# Patient Record
Sex: FEMALE | Race: WHITE | NOT HISPANIC OR LATINO
[De-identification: names, ages, dates, MRNs, and addresses within clinical notes are randomized per-mention and may not be internally consistent; named-entity substitution may affect disease eponyms.]

---

## 2023-01-03 PROBLEM — Z00.00 ENCOUNTER FOR PREVENTIVE HEALTH EXAMINATION: Status: ACTIVE | Noted: 2023-01-03

## 2023-02-22 ENCOUNTER — RESULT REVIEW (OUTPATIENT)
Age: 48
End: 2023-02-22

## 2023-02-22 ENCOUNTER — APPOINTMENT (OUTPATIENT)
Dept: PAIN MANAGEMENT | Facility: CLINIC | Age: 48
End: 2023-02-22
Payer: COMMERCIAL

## 2023-02-22 ENCOUNTER — TRANSCRIPTION ENCOUNTER (OUTPATIENT)
Age: 48
End: 2023-02-22

## 2023-02-22 VITALS
BODY MASS INDEX: 21.9 KG/M2 | HEART RATE: 77 BPM | SYSTOLIC BLOOD PRESSURE: 93 MMHG | DIASTOLIC BLOOD PRESSURE: 61 MMHG | HEIGHT: 70 IN | WEIGHT: 153 LBS | OXYGEN SATURATION: 100 %

## 2023-02-22 DIAGNOSIS — F41.9 ANXIETY DISORDER, UNSPECIFIED: ICD-10-CM

## 2023-02-22 DIAGNOSIS — M54.16 RADICULOPATHY, LUMBAR REGION: ICD-10-CM

## 2023-02-22 DIAGNOSIS — E78.00 PURE HYPERCHOLESTEROLEMIA, UNSPECIFIED: ICD-10-CM

## 2023-02-22 DIAGNOSIS — M54.50 LOW BACK PAIN, UNSPECIFIED: ICD-10-CM

## 2023-02-22 DIAGNOSIS — F32.A DEPRESSION, UNSPECIFIED: ICD-10-CM

## 2023-02-22 DIAGNOSIS — N83.209 UNSPECIFIED OVARIAN CYST, UNSPECIFIED SIDE: ICD-10-CM

## 2023-02-22 DIAGNOSIS — Z83.438 FAMILY HISTORY OF OTHER DISORDER OF LIPOPROTEIN METABOLISM AND OTHER LIPIDEMIA: ICD-10-CM

## 2023-02-22 DIAGNOSIS — Z83.49 FAMILY HISTORY OF OTHER ENDOCRINE, NUTRITIONAL AND METABOLIC DISEASES: ICD-10-CM

## 2023-02-22 DIAGNOSIS — Z78.9 OTHER SPECIFIED HEALTH STATUS: ICD-10-CM

## 2023-02-22 DIAGNOSIS — Z82.49 FAMILY HISTORY OF ISCHEMIC HEART DISEASE AND OTHER DISEASES OF THE CIRCULATORY SYSTEM: ICD-10-CM

## 2023-02-22 DIAGNOSIS — Z82.5 FAMILY HISTORY OF ASTHMA AND OTHER CHRONIC LOWER RESPIRATORY DISEASES: ICD-10-CM

## 2023-02-22 DIAGNOSIS — M54.6 LOW BACK PAIN, UNSPECIFIED: ICD-10-CM

## 2023-02-22 PROCEDURE — 99243 OFF/OP CNSLTJ NEW/EST LOW 30: CPT

## 2023-02-22 RX ORDER — GABAPENTIN 100 MG/1
100 CAPSULE ORAL
Qty: 90 | Refills: 2 | Status: ACTIVE | COMMUNITY
Start: 2023-02-22 | End: 1900-01-01

## 2023-02-22 RX ORDER — TIZANIDINE 4 MG/1
4 TABLET ORAL
Qty: 45 | Refills: 1 | Status: ACTIVE | COMMUNITY
Start: 2023-02-22 | End: 1900-01-01

## 2023-02-22 NOTE — REVIEW OF SYSTEMS
[Back Pain] : back pain [Neck Pain] : neck pain [Muscle Pain] : muscle pain [Radiating Pain] : radiating pain [Joint Pain] : joint pain [Joint Stiffness] : joint stiffness [Decreased ROM] : decreased range of motion [Weakness] : weakness [Negative] : Heme/Lymph

## 2023-02-22 NOTE — DATA REVIEWED
[FreeTextEntry1] : PROCEDURE: XR C SPINE 4 OR 5 VIEWS \par \par ORDER #: [id number] \par CC: ; ; ; \par End of cc's \par \par AP, LATERAL, AND BILATERAL OBLIQUE VIEWS OF THE CERVICAL SPINE \par \par  CLINICAL INDICATION: Chronic pain \par \par  COMPARISON: None. \par \par  FINDINGS: \par  Cervical spine is imaged to the C7 level. \par \par  The vertebral body heights are preserved without evidence of compression \par  fracture. Straightening of the normal cervical lordosis which may be due to \par  pain/muscle spasm or present on a chronic/degenerative basis. Facet alignment \par  is preserved. Mild to moderate intervertebral disc space narrowing, moderate \par  and most pronounced at at C5-6 and C6-7. Facet arthropathy with moderate \par  left-sided neuroforaminal narrowing at C2-3, C3-4, and C5-6. Note that the \par  degree of narrowing may be exaggerated due to patient poor positioning. No \par  significant right-sided neural foraminal narrowing. The prevertebral soft \par  tissues are unremarkable. \par \par  IMPRESSION: \par  Cervical spondylosis/degenerative disc disease as detailed above.

## 2023-02-22 NOTE — ASSESSMENT
[FreeTextEntry1] : Ms. ALLAN ONEAL is a 48 year F suffering from low back pain, that based upon today's subjective complaints, physical examination, and chart review, is likely secondary to lumbar radiculopathy, neck pain secondary to cervical spondylosis\par \par >> Medications\par \par Chronic opioid use for non-malignant pain, in particular at high doses would not be recommended since it can potentially lead to hyperalgesia (hypersensitivity), tolerance and addiction. \par  \par Gabapentin 100 mg po qhs titrate to tid as tolerated\par \par Tizanidine 4 mg po bid prn\par  \par >> Interventions\par  \par None indicated at this time\par  \par >> Therapy and Other Modalities\par  \par Continue with daily home stretching regimen\par \par >> Imaging and Other Studies\par \par XRAY C/T/L Spine\par \par >> Consults\par \par None ordered

## 2023-02-22 NOTE — CONSULT LETTER
[Dear  ___] : Dear  [unfilled], [Consult Letter:] : I had the pleasure of evaluating your patient, [unfilled]. [Please see my note below.] : Please see my note below. [Sincerely,] : Sincerely, [FreeTextEntry3] : Darnell Gilbert DO

## 2023-02-22 NOTE — PHYSICAL EXAM
[de-identified] : General: Well-developed and well-nourished. Tall stature. No acute distress.\par Psychiatric: Behavior was cooperative  \par Head:  Normocephalic and atraumatic\par Eyes:  Sclera white. Conjunctiva and eyelids pink and moist without discharge.\par Cardiovascular:  Regular\par Respiratory:  Trachea midline. Normal effort.\par No accessory muscle use with respiration\par Abdomen: Non distended, soft and nontender\par Skin:  No rashes, ulcers, or lesions appreciated.\par Neck: There is no pain with extension,     ROM wnl\par Back  There is + pain with extension,   ROM wnl\par Extremities: No edema \par Musculoskeletal: Moving all extremities freely \par Neuro: CN 2-12 Grossly intact\par Sensation to light touch is intact in all extremities\par Gait: Ambulates with no assistive device.
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
## 2023-03-14 ENCOUNTER — APPOINTMENT (OUTPATIENT)
Dept: PAIN MANAGEMENT | Facility: CLINIC | Age: 48
End: 2023-03-14
Payer: COMMERCIAL

## 2023-03-14 PROCEDURE — 99214 OFFICE O/P EST MOD 30 MIN: CPT | Mod: 95

## 2023-03-14 RX ORDER — GABAPENTIN 300 MG/1
300 CAPSULE ORAL 3 TIMES DAILY
Qty: 90 | Refills: 3 | Status: ACTIVE | COMMUNITY
Start: 2023-03-14 | End: 1900-01-01
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
## 2023-03-14 NOTE — ASSESSMENT
[FreeTextEntry1] : Ms. ALLAN ONEAL is a 48 year F suffering from low back pain, that based upon today's subjective complaints, physical examination, and chart review, is likely secondary to lumbar radiculopathy, neck pain secondary to cervical spondylosis\par \par >> Medications\par \par Chronic opioid use for non-malignant pain, in particular at high doses would not be recommended since it can potentially lead to hyperalgesia (hypersensitivity), tolerance and addiction. \par  \par Gabapentin 100 mg po qhs titrate to tid as tolerated\par \par Tizanidine 4 mg po bid prn\par  \par >> Interventions\par  \par Patient is an appropriate candidate for Bilateral C3, C4, C5 diagnostic medial branch blocks under fluoroscopic guidance x 2. If only short term pain is achieved with facet blocks, we will consider Radiofrequency denervation of facet joints. Procedure discussed with patient including success rate, side effects and complications. \par  \par >> Therapy and Other Modalities\par  \par Continue with daily home stretching regimen\par \par >> Imaging and Other Studies\par \par I have personally reviewed the images in detail together with the patient today, and I have answered all questions regarding this condition to the best of my ability, to the patient's satisfaction. \par \par >> Consults\par \par None ordered

## 2023-03-14 NOTE — PHYSICAL EXAM
[de-identified] : Physical Exam (Telemedicine): \par Gen: AAOX3, NAD\par HEENT: PERRLA, EOMI, NCAT, NP\par Pulmonary: No Signs of Respiratory Distress\par MSK: AROM WFL, Limitations ***\par Neurological: Grossly neurologically intact, Noted deficits painful truncal ROM\par Gait: Normal, Non-antalgic, Sans AD\par Derm: No Rash, (-)Lesions, (-)Erythema, (-)Cyanosis \par Psych: Calm, Cooperative, Conversational\par \par Disclaimer: This is a limited examination for the purposes of conducting a telemedicine visit during the COVID-19 pandemic. Physical exam maneuvers were modified as necessary to allow patient to self perform. A focused physician physical examination will be performed during in person visits and should be referred to to determine need for further testing, interventions or degree of disability.

## 2023-03-14 NOTE — DATA REVIEWED
[FreeTextEntry1] : CERVICAL SPONDYLOSIS. \par \par  Cervical spine x-rays 2/22/2023 \par \par  AP, oblique, neutral lateral and flexion-extension lateral views cervical \par  spine obtained. \par \par  Comparison previous cervical spine x-rays 11/16/2019. \par \par  There is straightening of the cervical lordosis. There is no spondylolisthesis \par  in neutral position. There is suspected slight anterolisthesis of \par  approximately 1 to 1.5 mm with flexion and similar 1 to 1.5 mm retrolisthesis \par  with extension. There is mild exaggerated kyphosis at C4-5 with flexion. \par \par  There is mild cervical spondylosis. There is again moderate intervertebral \par  disc height loss at C5-6 and C6-7 and mild disc height loss at C4-5 related to \par  degenerative disc disease. There is mild multilevel facet arthritis. \par \par  There is no abnormal prevertebral soft tissue swelling. \par \par  IMPRESSION: \par \par  Straightening of cervical lordosis with slight 1 to 1.5 mm anterolisthesis \par  with flexion and exaggerated kyphosis at C4-5 and 1 to 1.5 mm retrolisthesis \par  with extension. Clinical correlation. \par \par  Cervical spondylosis with degenerative disc disease and facet arthritis. \par \par PROCEDURE: XR LS SPINE AP LAT 2-3 VIEWS \par \par \par ORDER #: [id number] \par CC: ; ; ; \par End of cc's \par \par History: M56.5 THORACOLUMBAR BACK PAIN. \par \par  Thoracic spine x-rays 2/22/2023 \par \par  Lumbar spine x-rays 2/22/2023 \par \par  AP and lateral views of thoracic spine AP, lateral and coned-down L5-S1 \par  lateral views of lumbar spine provided. \par \par  No comparisons available. \par \par \par \par  Thoracic spine: \par \par  The alignment is satisfactory. There is no acute fracture. There is no \par  subluxation. \par \par  There is mild thoracic spondylosis. There is no significant intervertebral \par  disc height loss. There is no cortical bone destruction. \par \par  Lumbar spine: \par \par  There is straightening of the lumbar lordosis. There is slight dextroconvexity \par  of lumbar curvature. There is no acute fracture. There is no subluxation. \par \par  There is mild lumbar spondylosis. There is mild L5-S1 intervertebral disc \par  space narrowing. There is no cortical bone destruction. \par \par  IMPRESSION: \par \par  No acute fracture or subluxation. \par \par  Mild thoracic and lumbar spondylosis. \par \par  Mild L5-S1 degenerative disc disease. \par

## 2023-03-29 ENCOUNTER — APPOINTMENT (OUTPATIENT)
Dept: PAIN MANAGEMENT | Facility: CLINIC | Age: 48
End: 2023-03-29

## 2023-04-06 ENCOUNTER — RESULT REVIEW (OUTPATIENT)
Age: 48
End: 2023-04-06

## 2023-04-06 ENCOUNTER — TRANSCRIPTION ENCOUNTER (OUTPATIENT)
Age: 48
End: 2023-04-06

## 2023-04-06 ENCOUNTER — APPOINTMENT (OUTPATIENT)
Dept: PAIN MANAGEMENT | Facility: HOSPITAL | Age: 48
End: 2023-04-06

## 2023-05-01 ENCOUNTER — APPOINTMENT (OUTPATIENT)
Dept: PAIN MANAGEMENT | Facility: CLINIC | Age: 48
End: 2023-05-01
Payer: COMMERCIAL

## 2023-05-01 DIAGNOSIS — M79.10 MYALGIA, UNSPECIFIED SITE: ICD-10-CM

## 2023-05-01 DIAGNOSIS — M47.812 SPONDYLOSIS W/OUT MYELOPATHY OR RADICULOPATHY, CERVICAL REGION: ICD-10-CM

## 2023-05-01 PROCEDURE — 99214 OFFICE O/P EST MOD 30 MIN: CPT | Mod: 95

## 2023-05-01 RX ORDER — IBUPROFEN 600 MG/1
600 TABLET ORAL
Qty: 45 | Refills: 0 | Status: ACTIVE | COMMUNITY
Start: 2023-05-01 | End: 1900-01-01

## 2023-05-01 NOTE — PHYSICAL EXAM
[de-identified] : Physical Exam (Telemedicine): \par \par \par Gen: AAOX3, NAD\par HEENT: PERRLA, EOMI, NCAT, NP\par Pulmonary: No Signs of Respiratory Distress\par MSK: AROM WFL, Limitations painful cervical ROM\par Neurological: Grossly neurologically intact, Noted deficits none\par Gait: Normal, Non-antalgic, Sans AD\par Derm: No Rash, (-)Lesions, (-)Erythema, (-)Cyanosis \par Psych: Calm, Cooperative, Conversational\par \par Disclaimer: This is a limited examination for the purposes of conducting a telemedicine visit during the COVID-19 pandemic. Physical exam maneuvers were modified as necessary to allow patient to self perform. A focused physician physical examination will be performed during in person visits and should be referred to to determine need for further testing, interventions or degree of disability.

## 2023-05-01 NOTE — ASSESSMENT
[FreeTextEntry1] : Ms. ALLAN ONEAL is a 48 year F suffering from low back pain, that based upon today's subjective complaints, physical examination, and chart review, is likely secondary to lumbar radiculopathy, neck pain secondary to cervical spondylosis\par \par >> Medications\par \par Chronic opioid use for non-malignant pain, in particular at high doses would not be recommended since it can potentially lead to hyperalgesia (hypersensitivity), tolerance and addiction. \par  \par Gabapentin 100 mg po qhs titrate to tid as tolerated\par \par Tizanidine 4 mg po bid prn\par \par ibuprofen 600 mgpo bid prn severe pain only\par  \par >> Interventions\par  \par Patient is an appropriate candidate for repeat Bilateral C3, C4, C5 diagnostic medial branch blocks under fluoroscopic guidance. If only short term pain is achieved with facet blocks, we will consider Radiofrequency denervation of facet joints. Procedure discussed with patient including success rate, side effects and complications.  - she cannot take off from work at this time and will defer procedure till summer ***\par  \par >> Therapy and Other Modalities\par  \par Continue with daily home stretching regimen\par \par >> Imaging and Other Studies\par \par I have personally reviewed the images in detail together with the patient today, and I have answered all questions regarding this condition to the best of my ability, to the patient's satisfaction. \par \par >> Consults\par \par None ordered
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
## 2023-08-02 ENCOUNTER — TRANSCRIPTION ENCOUNTER (OUTPATIENT)
Age: 48
End: 2023-08-02

## 2024-12-10 PROBLEM — R92.2 INCONCLUSIVE MAMMOGRAPHY: Status: ACTIVE | Noted: 2024-12-10

## 2024-12-10 PROBLEM — N64.89 PSEUDOANGIOMATOUS STROMAL HYPERPLASIA OF BREAST: Status: ACTIVE | Noted: 2024-12-10

## 2024-12-11 ENCOUNTER — NON-APPOINTMENT (OUTPATIENT)
Age: 49
End: 2024-12-11

## 2024-12-11 ENCOUNTER — APPOINTMENT (OUTPATIENT)
Dept: BREAST CENTER | Facility: CLINIC | Age: 49
End: 2024-12-11
Payer: COMMERCIAL

## 2024-12-11 VITALS
WEIGHT: 148 LBS | SYSTOLIC BLOOD PRESSURE: 100 MMHG | BODY MASS INDEX: 21.19 KG/M2 | HEIGHT: 70 IN | DIASTOLIC BLOOD PRESSURE: 62 MMHG | HEART RATE: 77 BPM

## 2024-12-11 DIAGNOSIS — Z83.49 FAMILY HISTORY OF OTHER ENDOCRINE, NUTRITIONAL AND METABOLIC DISEASES: ICD-10-CM

## 2024-12-11 DIAGNOSIS — Z87.42 PERSONAL HISTORY OF OTHER DISEASES OF THE FEMALE GENITAL TRACT: ICD-10-CM

## 2024-12-11 DIAGNOSIS — R92.343 MAMMOGRAPHIC EXTREME DENSITY, BILATERAL BREASTS: ICD-10-CM

## 2024-12-11 DIAGNOSIS — Z78.9 OTHER SPECIFIED HEALTH STATUS: ICD-10-CM

## 2024-12-11 DIAGNOSIS — N64.89 OTHER SPECIFIED DISORDERS OF BREAST: ICD-10-CM

## 2024-12-11 DIAGNOSIS — Z12.31 ENCOUNTER FOR SCREENING MAMMOGRAM FOR MALIGNANT NEOPLASM OF BREAST: ICD-10-CM

## 2024-12-11 DIAGNOSIS — Z82.5 FAMILY HISTORY OF ASTHMA AND OTHER CHRONIC LOWER RESPIRATORY DISEASES: ICD-10-CM

## 2024-12-11 DIAGNOSIS — D24.2 BENIGN NEOPLASM OF LEFT BREAST: ICD-10-CM

## 2024-12-11 DIAGNOSIS — R92.2 INCONCLUSIVE MAMMOGRAM: ICD-10-CM

## 2024-12-11 DIAGNOSIS — Z87.39 PERSONAL HISTORY OF OTHER DISEASES OF THE MUSCULOSKELETAL SYSTEM AND CONNECTIVE TISSUE: ICD-10-CM

## 2024-12-11 DIAGNOSIS — Z86.2 PERSONAL HISTORY OF DISEASES OF THE BLOOD AND BLOOD-FORMING ORGANS AND CERTAIN DISORDERS INVOLVING THE IMMUNE MECHANISM: ICD-10-CM

## 2024-12-11 DIAGNOSIS — Z83.438 FAMILY HISTORY OF OTHER DISORDER OF LIPOPROTEIN METABOLISM AND OTHER LIPIDEMIA: ICD-10-CM

## 2024-12-11 DIAGNOSIS — Z80.1 FAMILY HISTORY OF MALIGNANT NEOPLASM OF TRACHEA, BRONCHUS AND LUNG: ICD-10-CM

## 2024-12-11 PROCEDURE — 99203 OFFICE O/P NEW LOW 30 MIN: CPT

## 2024-12-11 RX ORDER — SERTRALINE HYDROCHLORIDE 25 MG/1
TABLET, FILM COATED ORAL
Refills: 0 | Status: ACTIVE | COMMUNITY

## 2024-12-12 PROBLEM — Z12.31 BREAST CANCER SCREENING BY MAMMOGRAM: Status: ACTIVE | Noted: 2024-12-10

## 2024-12-12 PROBLEM — Z80.1 FAMILY HISTORY OF LUNG CANCER: Status: ACTIVE | Noted: 2024-12-11

## 2025-04-15 ENCOUNTER — APPOINTMENT (OUTPATIENT)
Dept: RHEUMATOLOGY | Facility: CLINIC | Age: 50
End: 2025-04-15
Payer: COMMERCIAL

## 2025-04-15 VITALS
SYSTOLIC BLOOD PRESSURE: 100 MMHG | BODY MASS INDEX: 21.62 KG/M2 | HEIGHT: 70 IN | DIASTOLIC BLOOD PRESSURE: 68 MMHG | OXYGEN SATURATION: 96 % | HEART RATE: 71 BPM | WEIGHT: 151 LBS

## 2025-04-15 DIAGNOSIS — M54.6 PAIN IN THORACIC SPINE: ICD-10-CM

## 2025-04-15 DIAGNOSIS — Z86.2 PERSONAL HISTORY OF DISEASES OF THE BLOOD AND BLOOD-FORMING ORGANS AND CERTAIN DISORDERS INVOLVING THE IMMUNE MECHANISM: ICD-10-CM

## 2025-04-15 DIAGNOSIS — G89.29 PAIN IN THORACIC SPINE: ICD-10-CM

## 2025-04-15 DIAGNOSIS — M79.671 PAIN IN RIGHT FOOT: ICD-10-CM

## 2025-04-15 PROCEDURE — 36415 COLL VENOUS BLD VENIPUNCTURE: CPT

## 2025-04-15 PROCEDURE — G2211 COMPLEX E/M VISIT ADD ON: CPT | Mod: NC

## 2025-04-15 PROCEDURE — 99205 OFFICE O/P NEW HI 60 MIN: CPT

## 2025-04-16 LAB
ALBUMIN SERPL ELPH-MCNC: 4.4 G/DL
ALP BLD-CCNC: 46 U/L
ALT SERPL-CCNC: 18 U/L
ANION GAP SERPL CALC-SCNC: 11 MMOL/L
AST SERPL-CCNC: 29 U/L
BILIRUB SERPL-MCNC: 0.7 MG/DL
BUN SERPL-MCNC: 17 MG/DL
CALCIUM SERPL-MCNC: 9.9 MG/DL
CHLORIDE SERPL-SCNC: 100 MMOL/L
CO2 SERPL-SCNC: 27 MMOL/L
CREAT SERPL-MCNC: 0.6 MG/DL
EGFRCR SERPLBLD CKD-EPI 2021: 111 ML/MIN/1.73M2
GLUCOSE SERPL-MCNC: 75 MG/DL
HCT VFR BLD CALC: 45 %
HGB BLD-MCNC: 13.9 G/DL
MCHC RBC-ENTMCNC: 28.7 PG
MCHC RBC-ENTMCNC: 30.9 G/DL
MCV RBC AUTO: 92.8 FL
PLATELET # BLD AUTO: 170 K/UL
POTASSIUM SERPL-SCNC: 4.5 MMOL/L
PROT SERPL-MCNC: 6.8 G/DL
RBC # BLD: 4.85 M/UL
RBC # FLD: 13.4 %
SODIUM SERPL-SCNC: 138 MMOL/L
URATE SERPL-MCNC: 3.1 MG/DL
WBC # FLD AUTO: 6.65 K/UL